# Patient Record
Sex: FEMALE | Race: WHITE | NOT HISPANIC OR LATINO | Employment: FULL TIME | URBAN - METROPOLITAN AREA
[De-identification: names, ages, dates, MRNs, and addresses within clinical notes are randomized per-mention and may not be internally consistent; named-entity substitution may affect disease eponyms.]

---

## 2017-03-15 ENCOUNTER — OFFICE VISIT (OUTPATIENT)
Dept: OBSTETRICS AND GYNECOLOGY | Facility: CLINIC | Age: 32
End: 2017-03-15

## 2017-03-15 VITALS
DIASTOLIC BLOOD PRESSURE: 70 MMHG | HEIGHT: 66 IN | SYSTOLIC BLOOD PRESSURE: 118 MMHG | WEIGHT: 191 LBS | BODY MASS INDEX: 30.7 KG/M2

## 2017-03-15 DIAGNOSIS — Z30.432 ENCOUNTER FOR REMOVAL OF INTRAUTERINE CONTRACEPTIVE DEVICE: ICD-10-CM

## 2017-03-15 DIAGNOSIS — Z01.419 WOMEN'S ANNUAL ROUTINE GYNECOLOGICAL EXAMINATION: Primary | ICD-10-CM

## 2017-03-15 PROCEDURE — 99385 PREV VISIT NEW AGE 18-39: CPT | Performed by: OBSTETRICS & GYNECOLOGY

## 2017-03-15 PROCEDURE — 58301 REMOVE INTRAUTERINE DEVICE: CPT | Performed by: OBSTETRICS & GYNECOLOGY

## 2017-03-15 RX ORDER — LORATADINE 10 MG/1
10 TABLET ORAL DAILY
Refills: 1 | COMMUNITY
Start: 2017-03-02

## 2017-03-15 RX ORDER — NORGESTIMATE AND ETHINYL ESTRADIOL 0.25-0.035
1 KIT ORAL DAILY
Qty: 1 PACKAGE | Refills: 11 | Status: SHIPPED | OUTPATIENT
Start: 2017-03-15

## 2017-03-15 NOTE — PROGRESS NOTES
"Subjective   Chief Complaint   Patient presents with   • Contraception     wants Mirena IUD removed and discuss other b/c     Cynthia Trejo is a 31 y.o. year old  presenting to be seen for her annual exam.    Current birth control method: IUD - Mirena.  This has been in place 12 years.  She is not having bleeding until spotting today.  Not sexually active currently would like to go on birth control pills.  Periods in the past last about 4 days.  She has a concern about hair growth on her chin in between her breasts.  I do not see any today she is her waxes or plucks this out.  Discussed birth control pills may help.  Is been going on for about 5 years.  Certainly does not look cushingoid at all we can check testosterone.  She is status post cryotherapy in  for sounds like moderate dysplasia.  She's not follow this up at all.  Also mentioned had a laparoscopy and that year.    4 9 lbs. 1 oz. child.  She does smoke half pack serous per day.      Patient's last menstrual period was 03/15/2017. last prior period was .    She is not sexually active.   Condoms are not typically used.      Past 6 month menstrual history:    Cycle Frequency: absent   Menstrual cycle character: flow is absent                     She exercises regularly: not asked.  Calcium intake: not asked.  She performs self breast exam:no.  She has concerns about domestic violence: no.    The following portions of the patient's history were reviewed and updated as appropriate:problem list, current medications, allergies, past family history, past medical history, past social history and past surgical history.    Review of Systems normal bladder and bowels     Objective   Visit Vitals   • /70   • Ht 65.75\" (167 cm)   • Wt 191 lb (86.6 kg)   • LMP 03/15/2017  Comment: Mirena IUD   • BMI 31.06 kg/m2       General:  well developed; well nourished  no acute distress  obese - Body mass index is 31.06 kg/(m^2).   Skin:  No " suspicious lesions seen  multiple tattoos    Thyroid: normal to inspection and palpation   Breasts:  Examined in supine position  Symmetric without masses or skin dimpling  Nipples normal without inversion, lesions or discharge  There are no palpable axillary nodes   Abdomen: soft, non-tender; no masses  no umbilical or inginual hernias are present  no hepato-splenomegaly   Pelvis: Clinical staff was present for exam  External genitalia:  normal appearance of the external genitalia including Bartholin's and McCune's glands.  :  urethral meatus normal; urethral hypermobility is absent.  Vaginal:  normal pink mucosa without prolapse or lesions. blood present -  small amount and At her cervix;  Cervix:  normal appearance. IUD string present - 2 cms in length;  Uterus:  normal size, shape and consistency. anteverted; Firm slightly tender anterior possible fundal fibroid  Adnexa:  normal bimanual exam of the adnexa.  Rectal:  digital rectal exam not performed; anus visually normal appearing.     Lab Review   No data reviewed    Imaging  No data reviewed       Assessment   1. Normal GYN examination with possible upper limits normal size uterus versus fibroid asymptomatic probably 8 weeks' size and most.  I would follow with how she does with birth control pills I don't think an ultrasound is necessary if she is asymptomatic.  2. Removal of Mirena IUD in place 12 years.     Plan   1. Will start birth control pills take nightly side effects of bleeding and decreased appetite discussed.  2. Follow-up Pap test results  3. Condoms if sexually active for STD protection  4. Annual exams    Medications Rx this encounter:  New Medications Ordered This Visit   Medications   • VENTOLIN  (90 BASE) MCG/ACT inhaler     Sig: Take 1 puff by mouth Daily.     Refill:  0   • loratadine (CLARITIN) 10 MG tablet     Sig: Take 10 mg by mouth Daily.     Refill:  1          This note was electronically signed.    Dajuan Cannon,  MD  3/15/2017

## 2017-08-02 ENCOUNTER — OFFICE VISIT (OUTPATIENT)
Dept: RETAIL CLINIC | Facility: CLINIC | Age: 32
End: 2017-08-02

## 2017-08-02 DIAGNOSIS — Z02.83 ENCOUNTER FOR DRUG SCREENING: Primary | ICD-10-CM
